# Patient Record
Sex: MALE | Race: WHITE | NOT HISPANIC OR LATINO | Employment: OTHER | URBAN - METROPOLITAN AREA
[De-identification: names, ages, dates, MRNs, and addresses within clinical notes are randomized per-mention and may not be internally consistent; named-entity substitution may affect disease eponyms.]

---

## 2019-01-19 ENCOUNTER — HOSPITAL ENCOUNTER (INPATIENT)
Facility: HOSPITAL | Age: 84
LOS: 2 days | Discharge: NURSING, REHAB OR LONG-TERM CARE | DRG: 057 | End: 2019-01-21
Attending: EMERGENCY MEDICINE | Admitting: INTERNAL MEDICINE
Payer: COMMERCIAL

## 2019-01-19 ENCOUNTER — APPOINTMENT (EMERGENCY)
Dept: CT IMAGING | Facility: HOSPITAL | Age: 84
DRG: 057 | End: 2019-01-19
Payer: COMMERCIAL

## 2019-01-19 DIAGNOSIS — G30.1 LATE ONSET ALZHEIMER'S DISEASE WITH BEHAVIORAL DISTURBANCE (HCC): Chronic | ICD-10-CM

## 2019-01-19 DIAGNOSIS — R46.89 AGGRESSIVE BEHAVIOR: Primary | ICD-10-CM

## 2019-01-19 DIAGNOSIS — F02.81 LATE ONSET ALZHEIMER'S DISEASE WITH BEHAVIORAL DISTURBANCE (HCC): Chronic | ICD-10-CM

## 2019-01-19 DIAGNOSIS — R41.82 ALTERED MENTAL STATUS: ICD-10-CM

## 2019-01-19 PROBLEM — I25.10 CORONARY ARTERY DISEASE INVOLVING NATIVE CORONARY ARTERY OF NATIVE HEART WITHOUT ANGINA PECTORIS: Chronic | Status: ACTIVE | Noted: 2019-01-19

## 2019-01-19 PROBLEM — F32.A ANXIETY AND DEPRESSION: Chronic | Status: ACTIVE | Noted: 2019-01-19

## 2019-01-19 PROBLEM — I10 ESSENTIAL HYPERTENSION: Chronic | Status: ACTIVE | Noted: 2019-01-19

## 2019-01-19 PROBLEM — F03.91 DEMENTIA WITH AGGRESSIVE BEHAVIOR (HCC): Status: ACTIVE | Noted: 2019-01-19

## 2019-01-19 PROBLEM — F41.9 ANXIETY AND DEPRESSION: Chronic | Status: ACTIVE | Noted: 2019-01-19

## 2019-01-19 PROBLEM — I48.0 PAROXYSMAL ATRIAL FIBRILLATION (HCC): Chronic | Status: ACTIVE | Noted: 2019-01-19

## 2019-01-19 LAB
ALBUMIN SERPL BCP-MCNC: 3.4 G/DL (ref 3.5–5)
ALP SERPL-CCNC: 83 U/L (ref 46–116)
ALT SERPL W P-5'-P-CCNC: 34 U/L (ref 12–78)
AMMONIA PLAS-SCNC: 15 UMOL/L (ref 11–35)
ANION GAP SERPL CALCULATED.3IONS-SCNC: 9 MMOL/L (ref 4–13)
AST SERPL W P-5'-P-CCNC: 41 U/L (ref 5–45)
BACTERIA UR QL AUTO: NORMAL /HPF
BASE EX.OXY STD BLDV CALC-SCNC: 85.2 % (ref 60–80)
BASE EXCESS BLDV CALC-SCNC: 2.2 MMOL/L
BASOPHILS # BLD AUTO: 0.04 THOUSANDS/ΜL (ref 0–0.1)
BASOPHILS NFR BLD AUTO: 1 % (ref 0–1)
BILIRUB SERPL-MCNC: 0.7 MG/DL (ref 0.2–1)
BILIRUB UR QL STRIP: NEGATIVE
BUN SERPL-MCNC: 18 MG/DL (ref 5–25)
CALCIUM SERPL-MCNC: 9 MG/DL (ref 8.3–10.1)
CHLORIDE SERPL-SCNC: 106 MMOL/L (ref 100–108)
CK MB SERPL-MCNC: 1.8 NG/ML (ref 0–5)
CK MB SERPL-MCNC: <1 % (ref 0–2.5)
CK SERPL-CCNC: 534 U/L (ref 39–308)
CLARITY UR: CLEAR
CO2 SERPL-SCNC: 28 MMOL/L (ref 21–32)
COLOR UR: YELLOW
CREAT SERPL-MCNC: 1.01 MG/DL (ref 0.6–1.3)
EOSINOPHIL # BLD AUTO: 0.11 THOUSAND/ΜL (ref 0–0.61)
EOSINOPHIL NFR BLD AUTO: 2 % (ref 0–6)
ERYTHROCYTE [DISTWIDTH] IN BLOOD BY AUTOMATED COUNT: 13.9 % (ref 11.6–15.1)
GFR SERPL CREATININE-BSD FRML MDRD: 68 ML/MIN/1.73SQ M
GLUCOSE SERPL-MCNC: 80 MG/DL (ref 65–140)
GLUCOSE UR STRIP-MCNC: NEGATIVE MG/DL
HCO3 BLDV-SCNC: 26.3 MMOL/L (ref 24–30)
HCT VFR BLD AUTO: 43.5 % (ref 36.5–49.3)
HGB BLD-MCNC: 14 G/DL (ref 12–17)
HGB UR QL STRIP.AUTO: NEGATIVE
IMM GRANULOCYTES # BLD AUTO: 0.03 THOUSAND/UL (ref 0–0.2)
IMM GRANULOCYTES NFR BLD AUTO: 1 % (ref 0–2)
INR PPP: 2.67 (ref 0.86–1.17)
KETONES UR STRIP-MCNC: NEGATIVE MG/DL
LACTATE SERPL-SCNC: 1.5 MMOL/L (ref 0.5–2)
LEUKOCYTE ESTERASE UR QL STRIP: NEGATIVE
LYMPHOCYTES # BLD AUTO: 1.2 THOUSANDS/ΜL (ref 0.6–4.47)
LYMPHOCYTES NFR BLD AUTO: 22 % (ref 14–44)
MCH RBC QN AUTO: 30.1 PG (ref 26.8–34.3)
MCHC RBC AUTO-ENTMCNC: 32.2 G/DL (ref 31.4–37.4)
MCV RBC AUTO: 94 FL (ref 82–98)
MONOCYTES # BLD AUTO: 0.57 THOUSAND/ΜL (ref 0.17–1.22)
MONOCYTES NFR BLD AUTO: 11 % (ref 4–12)
NEUTROPHILS # BLD AUTO: 3.49 THOUSANDS/ΜL (ref 1.85–7.62)
NEUTS SEG NFR BLD AUTO: 63 % (ref 43–75)
NITRITE UR QL STRIP: NEGATIVE
NON-SQ EPI CELLS URNS QL MICRO: NORMAL /HPF
NRBC BLD AUTO-RTO: 0 /100 WBCS
O2 CT BLDV-SCNC: 17.4 ML/DL
PCO2 BLDV: 39.2 MM HG (ref 42–50)
PH BLDV: 7.44 [PH] (ref 7.3–7.4)
PH UR STRIP.AUTO: 5.5 [PH] (ref 4.5–8)
PLATELET # BLD AUTO: 151 THOUSANDS/UL (ref 149–390)
PMV BLD AUTO: 11.2 FL (ref 8.9–12.7)
PO2 BLDV: 46.5 MM HG (ref 35–45)
POTASSIUM SERPL-SCNC: 3.9 MMOL/L (ref 3.5–5.3)
PROT SERPL-MCNC: 7.1 G/DL (ref 6.4–8.2)
PROT UR STRIP-MCNC: NEGATIVE MG/DL
PROTHROMBIN TIME: 27.6 SECONDS (ref 11.8–14.2)
RBC # BLD AUTO: 4.65 MILLION/UL (ref 3.88–5.62)
RBC #/AREA URNS AUTO: NORMAL /HPF
SODIUM SERPL-SCNC: 143 MMOL/L (ref 136–145)
SP GR UR STRIP.AUTO: 1.01 (ref 1–1.03)
TROPONIN I SERPL-MCNC: <0.02 NG/ML
TSH SERPL DL<=0.05 MIU/L-ACNC: 1.75 UIU/ML (ref 0.36–3.74)
UROBILINOGEN UR QL STRIP.AUTO: 0.2 E.U./DL
WBC # BLD AUTO: 5.44 THOUSAND/UL (ref 4.31–10.16)
WBC #/AREA URNS AUTO: NORMAL /HPF

## 2019-01-19 PROCEDURE — 82553 CREATINE MB FRACTION: CPT | Performed by: EMERGENCY MEDICINE

## 2019-01-19 PROCEDURE — 82805 BLOOD GASES W/O2 SATURATION: CPT | Performed by: EMERGENCY MEDICINE

## 2019-01-19 PROCEDURE — 99285 EMERGENCY DEPT VISIT HI MDM: CPT

## 2019-01-19 PROCEDURE — 80053 COMPREHEN METABOLIC PANEL: CPT | Performed by: EMERGENCY MEDICINE

## 2019-01-19 PROCEDURE — 83605 ASSAY OF LACTIC ACID: CPT | Performed by: EMERGENCY MEDICINE

## 2019-01-19 PROCEDURE — 85025 COMPLETE CBC W/AUTO DIFF WBC: CPT | Performed by: EMERGENCY MEDICINE

## 2019-01-19 PROCEDURE — 82140 ASSAY OF AMMONIA: CPT | Performed by: EMERGENCY MEDICINE

## 2019-01-19 PROCEDURE — 87086 URINE CULTURE/COLONY COUNT: CPT | Performed by: EMERGENCY MEDICINE

## 2019-01-19 PROCEDURE — 81001 URINALYSIS AUTO W/SCOPE: CPT | Performed by: EMERGENCY MEDICINE

## 2019-01-19 PROCEDURE — 96372 THER/PROPH/DIAG INJ SC/IM: CPT

## 2019-01-19 PROCEDURE — 85610 PROTHROMBIN TIME: CPT | Performed by: EMERGENCY MEDICINE

## 2019-01-19 PROCEDURE — 93005 ELECTROCARDIOGRAM TRACING: CPT

## 2019-01-19 PROCEDURE — 99223 1ST HOSP IP/OBS HIGH 75: CPT | Performed by: INTERNAL MEDICINE

## 2019-01-19 PROCEDURE — 82550 ASSAY OF CK (CPK): CPT | Performed by: EMERGENCY MEDICINE

## 2019-01-19 PROCEDURE — 84484 ASSAY OF TROPONIN QUANT: CPT | Performed by: EMERGENCY MEDICINE

## 2019-01-19 PROCEDURE — 36415 COLL VENOUS BLD VENIPUNCTURE: CPT | Performed by: EMERGENCY MEDICINE

## 2019-01-19 PROCEDURE — 84443 ASSAY THYROID STIM HORMONE: CPT | Performed by: EMERGENCY MEDICINE

## 2019-01-19 PROCEDURE — 70450 CT HEAD/BRAIN W/O DYE: CPT

## 2019-01-19 RX ORDER — HALOPERIDOL 5 MG/ML
5 INJECTION INTRAMUSCULAR EVERY 6 HOURS PRN
Status: DISCONTINUED | OUTPATIENT
Start: 2019-01-19 | End: 2019-01-21 | Stop reason: HOSPADM

## 2019-01-19 RX ORDER — ATENOLOL 25 MG/1
25 TABLET ORAL DAILY
COMMUNITY
Start: 2017-07-28

## 2019-01-19 RX ORDER — LORAZEPAM 2 MG/ML
1 INJECTION INTRAMUSCULAR ONCE
Status: DISCONTINUED | OUTPATIENT
Start: 2019-01-19 | End: 2019-01-19

## 2019-01-19 RX ORDER — WARFARIN SODIUM 5 MG/1
5 TABLET ORAL
Status: DISCONTINUED | OUTPATIENT
Start: 2019-01-19 | End: 2019-01-19

## 2019-01-19 RX ORDER — WARFARIN SODIUM 2.5 MG/1
5 TABLET ORAL
COMMUNITY
Start: 2017-10-06

## 2019-01-19 RX ORDER — PRAVASTATIN SODIUM 40 MG
40 TABLET ORAL DAILY
COMMUNITY
Start: 2017-03-30

## 2019-01-19 RX ORDER — OLANZAPINE 2.5 MG/1
2.5 TABLET ORAL 2 TIMES DAILY
Status: DISCONTINUED | OUTPATIENT
Start: 2019-01-19 | End: 2019-01-20

## 2019-01-19 RX ORDER — OLANZAPINE 10 MG/1
5 INJECTION, POWDER, LYOPHILIZED, FOR SOLUTION INTRAMUSCULAR ONCE
Status: COMPLETED | OUTPATIENT
Start: 2019-01-19 | End: 2019-01-19

## 2019-01-19 RX ORDER — ATENOLOL 25 MG/1
25 TABLET ORAL DAILY
Status: DISCONTINUED | OUTPATIENT
Start: 2019-01-20 | End: 2019-01-21 | Stop reason: HOSPADM

## 2019-01-19 RX ORDER — LORAZEPAM 1 MG/1
1 TABLET ORAL EVERY 8 HOURS PRN
Status: DISCONTINUED | OUTPATIENT
Start: 2019-01-19 | End: 2019-01-21 | Stop reason: HOSPADM

## 2019-01-19 RX ORDER — TAMSULOSIN HYDROCHLORIDE 0.4 MG/1
0.4 CAPSULE ORAL DAILY
COMMUNITY

## 2019-01-19 RX ORDER — TAMSULOSIN HYDROCHLORIDE 0.4 MG/1
0.4 CAPSULE ORAL
Status: DISCONTINUED | OUTPATIENT
Start: 2019-01-19 | End: 2019-01-21 | Stop reason: HOSPADM

## 2019-01-19 RX ORDER — WARFARIN SODIUM 7.5 MG/1
7.5 TABLET ORAL
Status: DISCONTINUED | OUTPATIENT
Start: 2019-01-19 | End: 2019-01-21 | Stop reason: HOSPADM

## 2019-01-19 RX ORDER — OXYBUTYNIN CHLORIDE 5 MG/1
10 TABLET, EXTENDED RELEASE ORAL DAILY
Status: DISCONTINUED | OUTPATIENT
Start: 2019-01-20 | End: 2019-01-21 | Stop reason: HOSPADM

## 2019-01-19 RX ORDER — WARFARIN SODIUM 5 MG/1
5 TABLET ORAL
Status: DISCONTINUED | OUTPATIENT
Start: 2019-01-20 | End: 2019-01-21 | Stop reason: HOSPADM

## 2019-01-19 RX ORDER — ACETAMINOPHEN 325 MG/1
650 TABLET ORAL EVERY 6 HOURS PRN
Status: DISCONTINUED | OUTPATIENT
Start: 2019-01-19 | End: 2019-01-21 | Stop reason: HOSPADM

## 2019-01-19 RX ORDER — LOSARTAN POTASSIUM 25 MG/1
25 TABLET ORAL DAILY
COMMUNITY
Start: 2017-08-17 | End: 2019-07-10

## 2019-01-19 RX ORDER — OLANZAPINE 2.5 MG/1
2.5 TABLET ORAL 2 TIMES DAILY
Status: ON HOLD | COMMUNITY
End: 2019-01-21

## 2019-01-19 RX ORDER — LOSARTAN POTASSIUM 25 MG/1
25 TABLET ORAL DAILY
Status: DISCONTINUED | OUTPATIENT
Start: 2019-01-20 | End: 2019-01-21 | Stop reason: HOSPADM

## 2019-01-19 RX ORDER — LEVOTHYROXINE SODIUM 0.03 MG/1
25 TABLET ORAL DAILY
COMMUNITY
Start: 2016-07-13

## 2019-01-19 RX ORDER — PRAVASTATIN SODIUM 40 MG
40 TABLET ORAL DAILY
Status: DISCONTINUED | OUTPATIENT
Start: 2019-01-20 | End: 2019-01-21 | Stop reason: HOSPADM

## 2019-01-19 RX ORDER — WARFARIN SODIUM 7.5 MG/1
7.5 TABLET ORAL
COMMUNITY
End: 2019-07-10

## 2019-01-19 RX ORDER — LEVOTHYROXINE SODIUM 0.03 MG/1
25 TABLET ORAL
Status: DISCONTINUED | OUTPATIENT
Start: 2019-01-20 | End: 2019-01-21 | Stop reason: HOSPADM

## 2019-01-19 RX ORDER — ONDANSETRON 2 MG/ML
4 INJECTION INTRAMUSCULAR; INTRAVENOUS EVERY 6 HOURS PRN
Status: DISCONTINUED | OUTPATIENT
Start: 2019-01-19 | End: 2019-01-21 | Stop reason: HOSPADM

## 2019-01-19 RX ORDER — LORAZEPAM 1 MG/1
1 TABLET ORAL EVERY 8 HOURS PRN
Status: ON HOLD | COMMUNITY
End: 2019-01-21

## 2019-01-19 RX ADMIN — OLANZAPINE 5 MG: 10 INJECTION, POWDER, FOR SOLUTION INTRAMUSCULAR at 12:31

## 2019-01-19 RX ADMIN — HALOPERIDOL LACTATE 5 MG: 5 INJECTION, SOLUTION INTRAMUSCULAR at 16:34

## 2019-01-19 RX ADMIN — TAMSULOSIN HYDROCHLORIDE 0.4 MG: 0.4 CAPSULE ORAL at 17:59

## 2019-01-19 RX ADMIN — WARFARIN SODIUM 7.5 MG: 7.5 TABLET ORAL at 17:58

## 2019-01-19 RX ADMIN — OLANZAPINE 2.5 MG: 2.5 TABLET, FILM COATED ORAL at 17:58

## 2019-01-19 NOTE — ED NOTES
Patient resting in bed, no problems  States when he has to pee            Bettina Tang  01/19/19 4729

## 2019-01-19 NOTE — ED PROVIDER NOTES
History  Chief Complaint   Patient presents with    Aggressive Behavior     pt comes from  due to "302" filled out by case management at facility  Per staff, pt has been getting increasingly aggitative, agressive, and homicidal  Pt has been "tested for UTI" per staff  On arrival pt is accompanied by EMS and Police in soft restraints  HPI     80-year-old male with history of anxiety, depression, paroxysmal atrial fibrillation on Coumadin, hypertension, hyperlipidemia, who presents from [facility] skilled nursing facility with aggression that has been getting progressively worse over the last 3 days  I spoke with Jani Gallagher, the care manager at the pt's SNF, who states the patient has been having homicidal ideation towards members of the staff there  The patient's wife also resides at the nursing facility after having a stroke  Jani Gallagher states that the patient goes upstairs to visit his wife, and is convinced that a female patient on his wife's floor is actually a man and has been coming on to his wife  He has tried to hurt that patient, and when attempts were made by staff to redirect him, he became violent, kicking and punching at staff  This has been increasingly escalating over the last 3 days  He has had some memory impairment prior to the last 3 days, but this has also been getting acutely worse over this period of time  She filed a 302 to have the patient brought to the emergency department  She states that the patient is not safe to return to their facility and they are concerned that he will end up harming himself, another resident there, or a staff member there  She denies any fevers or known recent illnesses for the patient  The patient has been refusing to take his medications for the last week  His medication list does include Zyprexa 2 5 mg 2 times a day, Ativan 1 mg as needed for aggression or anxiety, Coumadin, tamsulosin, losartan, levothyroxine      The pt denies any physical complaints  Prior to Admission Medications   Prescriptions Last Dose Informant Patient Reported? Taking? LORazepam (ATIVAN) 1 mg tablet   Yes Yes   Sig: Take 1 mg by mouth every 8 (eight) hours as needed for anxiety   Mirabegron ER (MYRBETRIQ) 50 MG TB24   Yes Yes   Sig: Take by mouth   OLANZapine (ZyPREXA) 2 5 mg tablet   Yes Yes   Sig: Take 2 5 mg by mouth 2 (two) times a day   TAMSULOSIN HCL PO   Yes Yes   Sig: Take by mouth   atenolol (TENORMIN) 25 mg tablet   Yes Yes   Sig: Take 25 mg by mouth daily   levothyroxine 25 mcg tablet   Yes Yes   Sig: Take 25 mcg by mouth daily   losartan (COZAAR) 25 mg tablet   Yes Yes   Sig: Take 25 mg by mouth daily   pravastatin (PRAVACHOL) 40 mg tablet   Yes Yes   Sig: Take 40 mg by mouth daily   warfarin (COUMADIN) 2 5 mg tablet   Yes Yes   Sig: Take 5 mg by mouth daily at bedtime     warfarin (COUMADIN) 7 5 mg tablet   Yes Yes   Sig: Take 7 5 mg by mouth daily      Facility-Administered Medications: None       Past Medical History:   Diagnosis Date    Cardiac disease     Hyperlipidemia     Hypertension        Past Surgical History:   Procedure Laterality Date    ANGIOPLASTY      THYROIDECTOMY         History reviewed  No pertinent family history  I have reviewed and agree with the history as documented  Social History   Substance Use Topics    Smoking status: Former Smoker    Smokeless tobacco: Not on file    Alcohol use No        Review of Systems   Constitutional: Negative for chills and fever  HENT: Negative for congestion  Eyes: Negative for visual disturbance  Respiratory: Negative for cough and shortness of breath  Cardiovascular: Negative for chest pain and leg swelling  Gastrointestinal: Negative for abdominal pain, diarrhea, nausea and vomiting  Genitourinary: Negative for dysuria and frequency  Musculoskeletal: Negative for arthralgias, back pain, neck pain and neck stiffness  Skin: Negative for rash     Neurological: Negative for weakness, numbness and headaches  Psychiatric/Behavioral: Positive for agitation, behavioral problems and confusion  Physical Exam  Physical Exam   Constitutional: He is oriented to person, place, and time  He appears well-developed and well-nourished  HENT:   Head: Normocephalic and atraumatic  Right Ear: External ear normal    Left Ear: External ear normal    Nose: Nose normal    Mouth/Throat: Oropharynx is clear and moist    Eyes: Conjunctivae are normal    Neck: Normal range of motion  Neck supple  Cardiovascular: Normal rate, regular rhythm and normal heart sounds  Exam reveals no gallop and no friction rub  No murmur heard  Pulmonary/Chest: Effort normal and breath sounds normal  No respiratory distress  He has no wheezes  He has no rales  Abdominal: Soft  Bowel sounds are normal  He exhibits no distension  There is no tenderness  There is no guarding  Musculoskeletal: Normal range of motion  He exhibits no edema or deformity  Neurological: He is alert and oriented to person, place, and time  He exhibits normal muscle tone  Skin: Skin is warm and dry  He is not diaphoretic  Psychiatric:   Intermittently agitated, shouting that he's angry and wants to go home, amenable to verbal de-escalation         Vital Signs  ED Triage Vitals   Temperature Pulse Respirations Blood Pressure SpO2   01/19/19 1245 01/19/19 1219 01/19/19 1219 01/19/19 1219 01/19/19 1219   98 1 °F (36 7 °C) 89 18 129/87 99 %      Temp Source Heart Rate Source Patient Position - Orthostatic VS BP Location FiO2 (%)   01/19/19 1245 01/19/19 1219 01/19/19 1219 01/19/19 1219 --   Oral Monitor Lying Right arm       Pain Score       01/19/19 1219       No Pain           Vitals:    01/19/19 1330 01/19/19 1427 01/19/19 1527 01/19/19 1557   BP: 115/94 136/82 128/83 133/98   Pulse: 79 74 85 87   Patient Position - Orthostatic VS: Lying Lying Sitting Sitting       Visual Acuity      ED Medications  Medications   atenolol (TENORMIN) tablet 25 mg (not administered)   levothyroxine tablet 25 mcg (not administered)   LORazepam (ATIVAN) tablet 1 mg (not administered)   losartan (COZAAR) tablet 25 mg (not administered)   oxybutynin (DITROPAN-XL) 24 hr tablet 10 mg (not administered)   OLANZapine (ZyPREXA) tablet 2 5 mg (2 5 mg Oral Given 1/19/19 1758)   pravastatin (PRAVACHOL) tablet 40 mg (not administered)   tamsulosin (FLOMAX) capsule 0 4 mg (0 4 mg Oral Given 1/19/19 1759)   warfarin (COUMADIN) tablet 7 5 mg (7 5 mg Oral Given 1/19/19 1758)   ondansetron (ZOFRAN) injection 4 mg (not administered)   acetaminophen (TYLENOL) tablet 650 mg (not administered)   haloperidol lactate (HALDOL) injection 5 mg (5 mg Intramuscular Given 1/19/19 1634)   warfarin (COUMADIN) tablet 5 mg (not administered)   OLANZapine (ZyPREXA) IM injection 5 mg (5 mg Intramuscular Given 1/19/19 1231)       Diagnostic Studies  Results Reviewed     Procedure Component Value Units Date/Time    TSH [[id number]]  (Normal) Collected:  01/19/19 1256    Lab Status:  Final result Specimen:  Blood from Arm, Right Updated:  01/19/19 1449     TSH 3RD GENERATON 1 747 uIU/mL     Narrative:         Patients undergoing fluorescein dye angiography may retain small amounts of fluorescein in the body for 48-72 hours post procedure  Samples containing fluorescein can produce falsely depressed TSH values  If the patient had this procedure,a specimen should be resubmitted post fluorescein clearance      Protime-INR [[id number]]  (Abnormal) Collected:  01/19/19 1405    Lab Status:  Final result Specimen:  Blood from Arm, Left Updated:  01/19/19 1429     Protime 27 6 (H) seconds      INR 2 67 (H)    CKMB [[id number]]  (Normal) Collected:  01/19/19 1256    Lab Status:  Final result Specimen:  Blood from Arm, Right Updated:  01/19/19 1343     CK-MB Index <1 0 %      CK-MB 1 8 ng/mL     CK (with reflex to MB) [[id number]]  (Abnormal) Collected:  01/19/19 1256    Lab Status:  Final result Specimen: Blood from Arm, Right Updated:  01/19/19 1342     Total  (H) U/L     Urinalysis with microscopic [[id number]] Collected:  01/19/19 1319    Lab Status:  Final result Specimen:  Urine from Urine, Straight Cath Updated:  01/19/19 1335     Clarity, UA Clear     Color, UA Yellow     Specific Gravity, UA 1 010     pH, UA 5 5     Glucose, UA Negative mg/dl      Ketones, UA Negative mg/dl      Blood, UA Negative     Protein, UA Negative mg/dl      Nitrite, UA Negative     Bilirubin, UA Negative     Urobilinogen, UA 0 2 E U /dl      Leukocytes, UA Negative     WBC, UA None Seen /hpf      RBC, UA None Seen /hpf      Bacteria, UA None Seen /hpf      Epithelial Cells Occasional /hpf     Lactic acid x2 Q2H [[id number]]  (Normal) Collected:  01/19/19 1256    Lab Status:  Final result Specimen:  Blood from Arm, Right Updated:  01/19/19 1326     LACTIC ACID 1 5 mmol/L     Narrative:         Result may be elevated if tourniquet was used during collection  Troponin I [[id number]]  (Normal) Collected:  01/19/19 1256    Lab Status:  Final result Specimen:  Blood from Arm, Right Updated:  01/19/19 1325     Troponin I <0 02 ng/mL     Urine culture [[id number]] Collected:  01/19/19 1319    Lab Status:   In process Specimen:  Urine from Urine, Other Updated:  01/19/19 1323    Comprehensive metabolic panel [[id number]]  (Abnormal) Collected:  01/19/19 1256    Lab Status:  Final result Specimen:  Blood from Arm, Right Updated:  01/19/19 1323     Sodium 143 mmol/L      Potassium 3 9 mmol/L      Chloride 106 mmol/L      CO2 28 mmol/L      ANION GAP 9 mmol/L      BUN 18 mg/dL      Creatinine 1 01 mg/dL      Glucose 80 mg/dL      Calcium 9 0 mg/dL      AST 41 U/L      ALT 34 U/L      Alkaline Phosphatase 83 U/L      Total Protein 7 1 g/dL      Albumin 3 4 (L) g/dL      Total Bilirubin 0 70 mg/dL      eGFR 68 ml/min/1 73sq m     Narrative:         National Kidney Disease Education Program recommendations are as follows:  GFR calculation is accurate only with a steady state creatinine  Chronic Kidney disease less than 60 ml/min/1 73 sq  meters  Kidney failure less than 15 ml/min/1 73 sq  meters  Ammonia [[id number]]  (Normal) Collected:  01/19/19 1256    Lab Status:  Final result Specimen:  Blood from Arm, Right Updated:  01/19/19 1318     Ammonia 15 umol/L     Blood gas, venous [[id number]]  (Abnormal) Collected:  01/19/19 1256    Lab Status:  Final result Specimen:  Blood from Arm, Right Updated:  01/19/19 1305     pH, Jarett 7 444 (H)     pCO2, Jarett 39 2 (L) mm Hg      pO2, Jarett 46 5 (H) mm Hg      HCO3, Jarett 26 3 mmol/L      Base Excess, Jarett 2 2 mmol/L      O2 Content, Jarett 17 4 ml/dL      O2 HGB, VENOUS 85 2 (H) %     CBC and differential [[id number]] Collected:  01/19/19 1256    Lab Status:  Final result Specimen:  Blood from Arm, Right Updated:  01/19/19 1302     WBC 5 44 Thousand/uL      RBC 4 65 Million/uL      Hemoglobin 14 0 g/dL      Hematocrit 43 5 %      MCV 94 fL      MCH 30 1 pg      MCHC 32 2 g/dL      RDW 13 9 %      MPV 11 2 fL      Platelets 835 Thousands/uL      nRBC 0 /100 WBCs      Neutrophils Relative 63 %      Immat GRANS % 1 %      Lymphocytes Relative 22 %      Monocytes Relative 11 %      Eosinophils Relative 2 %      Basophils Relative 1 %      Neutrophils Absolute 3 49 Thousands/µL      Immature Grans Absolute 0 03 Thousand/uL      Lymphocytes Absolute 1 20 Thousands/µL      Monocytes Absolute 0 57 Thousand/µL      Eosinophils Absolute 0 11 Thousand/µL      Basophils Absolute 0 04 Thousands/µL                  CT head without contrast   Final Result by Leilani Quinones MD (01/19 1437)      No acute intracranial abnormality  Microangiopathic changes                    Workstation performed: [id number]                    Procedures  Procedures       Phone Contacts  ED Phone Contact    ED Course                               MDM  Number of Diagnoses or Management Options  Aggressive behavior:   Altered mental status:   Diagnosis management comments: Patient arrives in 4 point restraints placed by EMS for agitation  He is yelling, kicking, and straining against the restraints  He keeps repetitively screaming about someone at the place where he lives "messing with my wife "  He was able to be verbally de-escalated by staff  He denies any physical complaints  He is oriented to person and place, thinks the year is 2018, knows the month  He is unable to give me any information about his medical history  States that "I'm going to get in my car and go after those people that are hurting my wife "  He is unable to tell me exactly what's going on with his wife that he is concerned about  Afebrile and hemodynamically stable  Nontoxic appearing  CBC with no leukocytosis and normal hemoglobin  CMP unremarkable with normal renal function  CK obtained as the patient has been straining against his restraints, elevated to 534  She had restraints removed shortly after patient arrived after de-escalation  No lactic acidosis  Troponin is not elevated  I personally interpreted the patient's EKG, which reveals AFib with left axis deviation, normal intervals, Q-wave in leads V2 and V3, no prior available for comparison  No ST segment deviation or T-wave inversions  Venous blood gas with mild respiratory alkalosis, no hypercarbia  Urinalysis without evidence of UTI  INR is therapeutic at 2 67  CT head with no acute intracranial abnormalities  Patient given 5 milligrams of IM Zyprexa with resolution of his agitation  He does have Zyprexa listed on his home medication list, but has reportedly been refusing to take this at the nursing facility  Given his negative workup and benign physical exam, I have a low suspicion for underlying organic etiology of his symptoms, and I am suspicious that he is developing dementia  He would benefit from neuro cognitive evaluation    He is likely not safe to return to his skilled nursing facility given their concern for his ability to harm himself or other people there  May benefit from placement at a memory care unit  Will admit to Medicine for further workup  Amount and/or Complexity of Data Reviewed  Clinical lab tests: ordered and reviewed  Tests in the radiology section of CPT®: ordered and reviewed  Obtain history from someone other than the patient: yes  Independent visualization of images, tracings, or specimens: yes    Patient Progress  Patient progress: stable    CritCare Time         Disposition  Final diagnoses:   Aggressive behavior   Altered mental status     Time reflects when diagnosis was documented in both MDM as applicable and the Disposition within this note     Time User Action Codes Description Comment    1/19/2019  3:34 PM Ronna Jakes Add [R46 89] Aggressive behavior     1/19/2019  3:34 PM Ronna Jakes Add [R41 82] Altered mental status     1/19/2019  4:12 PM Gibsland Kierra Add [G30 1,  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     1/19/2019  4:12 PM [place] Kierra Modify [G30 1,  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     1/19/2019  4:12 PM [place] Kierra Modify [G30 1,  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       ED Disposition     ED Disposition Condition Comment    Admit  Case was discussed with SHEEBA and the patient's admission status was agreed to be Admission Status: inpatient status to the service of Dr Lynn Martinez           Follow-up Information    None         Current Discharge Medication List      CONTINUE these medications which have NOT CHANGED    Details   atenolol (TENORMIN) 25 mg tablet Take 25 mg by mouth daily      levothyroxine 25 mcg tablet Take 25 mcg by mouth daily      LORazepam (ATIVAN) 1 mg tablet Take 1 mg by mouth every 8 (eight) hours as needed for anxiety      losartan (COZAAR) 25 mg tablet Take 25 mg by mouth daily      Mirabegron ER (MYRBETRIQ) 50 MG TB24 Take by mouth      OLANZapine (ZyPREXA) 2 5 mg tablet Take 2 5 mg by mouth 2 (two) times a day      pravastatin (PRAVACHOL) 40 mg tablet Take 40 mg by mouth daily      TAMSULOSIN HCL PO Take by mouth      !! warfarin (COUMADIN) 2 5 mg tablet Take 5 mg by mouth daily at bedtime        !! warfarin (COUMADIN) 7 5 mg tablet Take 7 5 mg by mouth daily       ! ! - Potential duplicate medications found  Please discuss with provider  No discharge procedures on file      ED Provider  Electronically Signed by           Jesus Hirsch MD  01/19/19 3309

## 2019-01-19 NOTE — ED NOTES
Additional staff called to bedside to administer IM medications       Kyle Bianchi RN  01/19/19 3762

## 2019-01-19 NOTE — ASSESSMENT & PLAN NOTE
· Patient with Anxiety and Depression listed on medical history of SNF  · Continue Ativan 1 mg PO Q8 PRN  · Zyprexa 2 5 mg BID   · Psych consult as per primary

## 2019-01-19 NOTE — ASSESSMENT & PLAN NOTE
· POA, patient with worsening few days of aggressive behavior, agitation, and paranoia  He believes that there is a female resident (who he believes is a male) is coming onto his wife  There may have been threats of violence with a firearm made and the facility petitioned for a 302  There was documentation from the ER provider who spoke with a nursing home employee that he was violent toward staff   Son described the patient as "ornery"  · Admit patient to med/surg under inpatient status   · Consult psychiatry   · Continue Zyprexa BID and Ativan Q8 PRN  · Haldol 5 mg IM Q6 PRN agitation
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
## 2019-01-19 NOTE — ED NOTES
Patient laying down, resraints are off at the moment   No complaints or distress     Bettina Ladd  01/19/19 8890

## 2019-01-20 LAB — BACTERIA UR CULT: NORMAL

## 2019-01-20 PROCEDURE — 99232 SBSQ HOSP IP/OBS MODERATE 35: CPT | Performed by: INTERNAL MEDICINE

## 2019-01-20 RX ORDER — DIVALPROEX SODIUM 125 MG/1
125 TABLET, DELAYED RELEASE ORAL EVERY 12 HOURS SCHEDULED
Status: DISCONTINUED | OUTPATIENT
Start: 2019-01-20 | End: 2019-01-21 | Stop reason: HOSPADM

## 2019-01-20 RX ORDER — OLANZAPINE 2.5 MG/1
5 TABLET ORAL 2 TIMES DAILY
Status: DISCONTINUED | OUTPATIENT
Start: 2019-01-20 | End: 2019-01-21 | Stop reason: HOSPADM

## 2019-01-20 RX ADMIN — LOSARTAN POTASSIUM 25 MG: 25 TABLET, FILM COATED ORAL at 09:18

## 2019-01-20 RX ADMIN — LEVOTHYROXINE SODIUM 25 MCG: 25 TABLET ORAL at 07:18

## 2019-01-20 RX ADMIN — PRAVASTATIN SODIUM 40 MG: 40 TABLET ORAL at 09:18

## 2019-01-20 RX ADMIN — ATENOLOL 25 MG: 25 TABLET ORAL at 09:18

## 2019-01-20 RX ADMIN — OLANZAPINE 5 MG: 2.5 TABLET, FILM COATED ORAL at 18:06

## 2019-01-20 RX ADMIN — WARFARIN SODIUM 5 MG: 5 TABLET ORAL at 18:06

## 2019-01-20 RX ADMIN — DIVALPROEX SODIUM 125 MG: 125 TABLET, DELAYED RELEASE ORAL at 21:46

## 2019-01-20 RX ADMIN — OLANZAPINE 2.5 MG: 2.5 TABLET, FILM COATED ORAL at 09:18

## 2019-01-20 RX ADMIN — OXYBUTYNIN CHLORIDE 10 MG: 5 TABLET, EXTENDED RELEASE ORAL at 09:18

## 2019-01-20 RX ADMIN — TAMSULOSIN HYDROCHLORIDE 0.4 MG: 0.4 CAPSULE ORAL at 18:05

## 2019-01-20 RX ADMIN — DIVALPROEX SODIUM 125 MG: 125 TABLET, DELAYED RELEASE ORAL at 14:16
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
## 2019-01-20 NOTE — PROGRESS NOTES
Pt admitted to floor around 1600  Patient disoriented and unable to complete Navigator  Bilateral wrist restraints ordered and applied  Patient also on continuous observation with a PCA (Heather) sitting in room
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
## 2019-01-20 NOTE — ASSESSMENT & PLAN NOTE
· Patient with Anxiety and Depression listed on medical history of SNF  · Continue Ativan 1 mg PO Q8 PRN  · Zyprexa 2 5 mg BID   · Started Depakote 125 mg PO Q12   · Discussed with attending   · Psych consult as per primary

## 2019-01-20 NOTE — PLAN OF CARE
Problem: Potential for Falls  Goal: Patient will remain free of falls  INTERVENTIONS:  - Assess patient frequently for physical needs  -  Identify cognitive and physical deficits and behaviors that affect risk of falls  -  [place] fall precautions as indicated by assessment   - Educate patient/family on patient safety including physical limitations  - Instruct patient to call for assistance with activity based on assessment  - Modify environment to reduce risk of injury  - Consider OT/PT consult to assist with strengthening/mobility   Outcome: Progressing      Problem: DISCHARGE PLANNING  Goal: Discharge to home or other facility with appropriate resources  INTERVENTIONS:  - Identify barriers to discharge w/patient and caregiver  - Arrange for needed discharge resources and transportation as appropriate  - Identify discharge learning needs (meds, wound care, etc )  - Arrange for interpretive services to assist at discharge as needed  - Refer to Case Management Department for coordinating discharge planning if the patient needs post-hospital services based on physician/advanced practitioner order or complex needs related to functional status, cognitive ability, or social support system  Outcome: Progressing      Problem: Knowledge Deficit  Goal: Patient/family/caregiver demonstrates understanding of disease process, treatment plan, medications, and discharge instructions  Complete learning assessment and assess knowledge base    Interventions:  - Provide teaching at level of understanding  - Provide teaching via preferred learning methods  Outcome: Progressing

## 2019-01-20 NOTE — PROGRESS NOTES
ER charge RN Avelina Burks delivered wallet to room brought in by EMS  $11 in wallet counted in front of him

## 2019-01-20 NOTE — PROGRESS NOTES
[facility]  Progress Note - Quincy Coad 1933, 80 y o  male MRN: [id number]    Unit/Bed#: -01 Encounter: [id number]    Primary Care Provider: Sheree Urena MD   Date and time admitted to hospital: 1/19/2019 12:10 PM        * Late onset Alzheimer's disease with behavioral disturbance   Assessment & Plan    · POA, patient with worsening few days of aggressive behavior, agitation, and paranoia  He believes that there is a female resident (who he believes is a male) is coming onto his wife  There may have been threats of violence with a firearm made and the facility petitioned for a 302 (however the legality of this is questionable given that it was NOT an MD/DO who filled out the 302)  There was documentation from the ER provider who spoke with a nursing home employee that he was violent toward staff  Son described the patient as "ornery"  He continues to perseverate over this person talking to his wife as well as the nursing home possibly stealing his money  He has only required one dose of Haldol yesterday and is now off restraints       · Consult psychiatry - recommendations pending   · Start Depakote 125 mg PO BID   · Continue Zyprexa BID and Ativan Q8 PRN  · Haldol 5 mg IM Q6 PRN agitation   · Discontinue restraints  · Continue 1:1     Paroxysmal atrial fibrillation (HCC)   Assessment & Plan    · Appears NSR at this time   · Continue current home management      Coronary artery disease involving native coronary artery of native heart without angina pectoris   Assessment & Plan    · Patient with listed history of angioplasty   · Continue Atenolol, Pravastatin     Anxiety and depression   Assessment & Plan    · Patient with Anxiety and Depression listed on medical history of SNF  · Continue Ativan 1 mg PO Q8 PRN  · Zyprexa 2 5 mg BID   · Started Depakote 125 mg PO Q12   · Discussed with attending   · Psych consult as per primary      Essential hypertension   Assessment & Plan · BP acceptable on admission   · Continue Atenolol, Losartan       VTE Pharmacologic Prophylaxis:   Pharmacologic: Warfarin (Coumadin)  Mechanical VTE Prophylaxis in Place: No    Patient Centered Rounds: I have performed bedside rounds with nursing staff today  Discussions with Specialists or Other Care Team Provider: Discussed with RN, LEANDRA    Education and Discussions with Family / Patient: Called patient's son     Time Spent for Care: 30 minutes  More than 50% of total time spent on counseling and coordination of care as described above  Current Length of Stay: 1 day(s)    Current Patient Status: Inpatient   Certification Statement: The patient will continue to require additional inpatient hospital stay due to pending psych recommendations and further safe discharge plans     Discharge Plan: Pending at this time    Code Status: Level 3 - DNAR and DNI      Subjective:   Patient continues to perseverate over the person that he believes is coming onto his wife  Continues to want to go home to see her and to stop the person he believes is pestering her from pestering her  No specific threats made, but he states that "he takes care of himself" and gestures with fists  1:1 stats that he has been doing okay off restraints  Will talk calmly, then talk about "that son of a bitch" and clench his fists, but is redirectable  Objective:     Vitals:   Temp (24hrs), Av °F (36 7 °C), Min:97 7 °F (36 5 °C), Max:98 1 °F (36 7 °C)    Temp:  [97 7 °F (36 5 °C)-98 1 °F (36 7 °C)] 97 7 °F (36 5 °C)  HR:  [61-89] 61  Resp:  [16-18] 18  BP: (100-136)/(52-98) 129/66  SpO2:  [94 %-100 %] 95 %  There is no height or weight on file to calculate BMI  Input and Output Summary (last 24 hours):        Intake/Output Summary (Last 24 hours) at 19 1130  Last data filed at 19 0804   Gross per 24 hour   Intake              300 ml   Output              350 ml   Net              -50 ml       Physical Exam:     Physical Exam Constitutional: Vital signs are normal  He appears well-developed and well-nourished  Non-toxic appearance  No distress  HENT:   Head: Normocephalic and atraumatic  Eyes: Pupils are equal, round, and reactive to light  Conjunctivae and EOM are normal    Neck: Neck supple  Cardiovascular: Normal rate, regular rhythm, S1 normal, S2 normal, normal heart sounds and intact distal pulses  Exam reveals no S3 and no S4  No murmur heard  Pulmonary/Chest: Effort normal and breath sounds normal  No accessory muscle usage  No respiratory distress  He has no decreased breath sounds  He has no wheezes  He has no rhonchi  He has no rales  He exhibits no tenderness  Abdominal: Soft  Bowel sounds are normal  He exhibits no distension and no mass  There is no tenderness  There is no rigidity, no rebound and no guarding  Neurological: He is alert  Skin: Skin is warm and dry  Psychiatric: His speech is normal  His affect is angry  He is agitated  He is not combative  Thought content is paranoid  He expresses no homicidal and no suicidal ideation  He expresses no suicidal plans and no homicidal plans  Additional Data:     Labs:      Results from last 7 days  Lab Units 01/19/19  1256   WBC Thousand/uL 5 44   HEMOGLOBIN g/dL 14 0   HEMATOCRIT % 43 5   PLATELETS Thousands/uL 151   NEUTROS PCT % 63   LYMPHS PCT % 22   MONOS PCT % 11   EOS PCT % 2       Results from last 7 days  Lab Units 01/19/19  1256   POTASSIUM mmol/L 3 9   CHLORIDE mmol/L 106   CO2 mmol/L 28   BUN mg/dL 18   CREATININE mg/dL 1 01   CALCIUM mg/dL 9 0   ALK PHOS U/L 83   ALT U/L 34   AST U/L 41       Results from last 7 days  Lab Units 01/19/19  1405   INR  2 67*       * I Have Reviewed All Lab Data Listed Above  * Additional Pertinent Lab Tests Reviewed:  All Labs For Current Hospital Admission Reviewed    Imaging:    Imaging Reports Reviewed Today Include: None  Imaging Personally Reviewed by Myself Includes:  None    Recent Cultures (last 7 days): Last 24 Hours Medication List:     Current Facility-Administered Medications:  acetaminophen 650 mg Oral Q6H PRN Chet Ashley PA-C   atenolol 25 mg Oral Daily Chet Ashley, ALETHEA   divalproex sodium 125 mg Oral Q12H [facility] Chet Ashley, ALETHEA   haloperidol lactate 5 mg Intramuscular Q6H PRN Laureano Kern PA-C   levothyroxine 25 mcg Oral Early Morning Chet Ashley, ALETHEA   LORazepam 1 mg Oral Q8H PRN Chet Calderon Killings, ALETHEA   losartan 25 mg Oral Daily Chet Ashley, ALETHEA   OLANZapine 2 5 mg Oral BID Chet Ashley, ALETHEA   ondansetron 4 mg Intravenous Q6H PRN Chet Edra Killings, ALETHEA   oxybutynin 10 mg Oral Daily Chet Edra Killings, ALETHEA   pravastatin 40 mg Oral Daily Chetjuancho Mendezs, ALETHEA   tamsulosin 0 4 mg Oral Daily With Dinner Chet Ashley PA-C   warfarin 5 mg Oral Once per day on Sun Mon Wed Fri Beba Ray MD   warfarin 7 5 mg Oral Once per day on Tue Thu Sat Laureano Kern PA-C        Today, Patient Was Seen By: Laureano Kern PA-C    ** Please Note: Dictation voice to text software may have been used in the creation of this document   **

## 2019-01-21 VITALS
OXYGEN SATURATION: 99 % | RESPIRATION RATE: 18 BRPM | TEMPERATURE: 98.1 F | WEIGHT: 214.95 LBS | DIASTOLIC BLOOD PRESSURE: 81 MMHG | HEART RATE: 60 BPM | SYSTOLIC BLOOD PRESSURE: 172 MMHG

## 2019-01-21 PROCEDURE — 99239 HOSP IP/OBS DSCHRG MGMT >30: CPT | Performed by: INTERNAL MEDICINE

## 2019-01-21 RX ORDER — OLANZAPINE 2.5 MG/1
5 TABLET ORAL 2 TIMES DAILY
Qty: 120 TABLET | Refills: 0 | Status: SHIPPED | OUTPATIENT
Start: 2019-01-21

## 2019-01-21 RX ORDER — LORAZEPAM 1 MG/1
1 TABLET ORAL EVERY 8 HOURS PRN
Qty: 60 TABLET | Refills: 0 | Status: SHIPPED | OUTPATIENT
Start: 2019-01-21 | End: 2019-01-31

## 2019-01-21 RX ORDER — DIVALPROEX SODIUM 125 MG/1
125 TABLET, DELAYED RELEASE ORAL EVERY 12 HOURS SCHEDULED
Qty: 60 TABLET | Refills: 0 | Status: SHIPPED | OUTPATIENT
Start: 2019-01-21

## 2019-01-21 RX ADMIN — ATENOLOL 25 MG: 25 TABLET ORAL at 08:29

## 2019-01-21 RX ADMIN — LEVOTHYROXINE SODIUM 25 MCG: 25 TABLET ORAL at 05:28

## 2019-01-21 RX ADMIN — OLANZAPINE 5 MG: 2.5 TABLET, FILM COATED ORAL at 08:29

## 2019-01-21 RX ADMIN — OXYBUTYNIN CHLORIDE 10 MG: 5 TABLET, EXTENDED RELEASE ORAL at 08:29

## 2019-01-21 RX ADMIN — DIVALPROEX SODIUM 125 MG: 125 TABLET, DELAYED RELEASE ORAL at 08:29

## 2019-01-21 RX ADMIN — LOSARTAN POTASSIUM 25 MG: 25 TABLET, FILM COATED ORAL at 08:29

## 2019-01-21 RX ADMIN — HALOPERIDOL LACTATE 5 MG: 5 INJECTION, SOLUTION INTRAMUSCULAR at 02:32

## 2019-01-21 RX ADMIN — PRAVASTATIN SODIUM 40 MG: 40 TABLET ORAL at 08:29

## 2019-01-21 NOTE — ASSESSMENT & PLAN NOTE
· POA, patient with worsening few days of aggressive behavior, agitation, and paranoia  He believes that there is a female resident (who he believes is a male) is coming onto his wife  There may have been threats of violence with a firearm made and the facility petitioned for a 302 (however the legality of this is questionable given that it was NOT an MD/DO who filled out the 302)  There was documentation from the ER provider who spoke with a nursing home employee that he was violent toward staff  Son described the patient as "ornery"  He continues to perseverate over this person talking to his wife as well as the nursing home possibly stealing his money  · Mood appears improved  Psych consult noted  Currently on Depakote 125 mg BID and Zyprexa 5 mg BID with PRN ativan available  · UA negative, Ucx negative   Afebrile, no leukocytosis   · Await CM input to see if we can get patient back to facility

## 2019-01-21 NOTE — ASSESSMENT & PLAN NOTE
· POA, patient with worsening few days of aggressive behavior, agitation, and paranoia  He believes that there is a female resident (who he believes is a male) is coming onto his wife  There may have been threats of violence with a firearm made and the facility petitioned for a 302 (however the legality of this is questionable given that it was NOT an MD/DO who filled out the 302)  There was documentation from the ER provider who spoke with a nursing home employee that he was violent toward staff  Son described the patient as "ornery"  · Mood appears improved  Psych consult noted  · Currently on Depakote 125 mg BID and Zyprexa 5 mg BID with PRN ativan available  · UA negative, Ucx negative   Afebrile, no leukocytosis, no identifiable infectious source   · Pt will return to [facility]

## 2019-01-21 NOTE — PROGRESS NOTES
Progress Note - Amanda Code 1933, 80 y o  male MRN: [id number]    Unit/Bed#: -01 Encounter: [id number]    Primary Care Provider: Tommie Holstein, MD   Date and time admitted to hospital: 1/19/2019 12:10 PM        * Late onset Alzheimer's disease with behavioral disturbance   Assessment & Plan    · POA, patient with worsening few days of aggressive behavior, agitation, and paranoia  He believes that there is a female resident (who he believes is a male) is coming onto his wife  There may have been threats of violence with a firearm made and the facility petitioned for a 302 (however the legality of this is questionable given that it was NOT an MD/DO who filled out the 302)  There was documentation from the ER provider who spoke with a nursing home employee that he was violent toward staff  Son described the patient as "ornery"  He continues to perseverate over this person talking to his wife as well as the nursing home possibly stealing his money  · Mood appears improved  Psych consult noted  Currently on Depakote 125 mg BID and Zyprexa 5 mg BID with PRN ativan available  · UA negative, Ucx negative   Afebrile, no leukocytosis   · Await CM input to see if we can get patient back to facility      Paroxysmal atrial fibrillation (HCC)   Assessment & Plan    · Appears NSR at this time   · Continue current home management      Anxiety and depression   Assessment & Plan    · Patient with Anxiety and Depression listed on medical history of SNF  · Continue current meds  · Appreciate psych input      Essential hypertension   Assessment & Plan    · BP acceptable on admission   · Continue Atenolol, Losartan     Coronary artery disease involving native coronary artery of native heart without angina pectoris   Assessment & Plan    · Patient with listed history of angioplasty   · Continue Atenolol, Pravastatin       VTE Pharmacologic Prophylaxis:   Pharmacologic: Warfarin (Coumadin)  Mechanical VTE Prophylaxis in Place: Yes    Patient Centered Rounds: I have performed bedside rounds with nursing staff today  Discussions with Specialists or Other Care Team Provider: case management  Education and Discussions with Family / Patient: patient  Left voicemail for son Demetrio Roby  Time Spent for Care: 30 minutes  More than 50% of total time spent on counseling and coordination of care as described above  Current Length of Stay: 2 day(s)    Current Patient Status: Inpatient   Certification Statement: The patient will continue to require additional inpatient hospital stay due to as above    Discharge Plan: medically stable  Mood improved  Awaiting discharge back to rehab if okay with CM    Code Status: Level 3 - DNAR and DNI      Subjective:   Patient reports doing well  He states that today is his bday and wants to go back to see his wife  Objective:     Vitals:   Temp (24hrs), Av °F (36 7 °C), Min:97 8 °F (36 6 °C), Max:98 1 °F (36 7 °C)    Temp:  [97 8 °F (36 6 °C)-98 1 °F (36 7 °C)] 98 1 °F (36 7 °C)  HR:  [60-66] 60  Resp:  [18] 18  BP: (132-172)/(74-81) 172/81  SpO2:  [95 %-99 %] 99 %  There is no height or weight on file to calculate BMI  Input and Output Summary (last 24 hours): Intake/Output Summary (Last 24 hours) at 19 1048  Last data filed at 19 1940   Gross per 24 hour   Intake              240 ml   Output              350 ml   Net             -110 ml       Physical Exam:     Physical Exam   Constitutional: No distress  Cardiovascular: Normal rate and regular rhythm  Pulmonary/Chest: Effort normal and breath sounds normal  No respiratory distress  Abdominal: Soft  Bowel sounds are normal  He exhibits no distension  There is no tenderness  Musculoskeletal: He exhibits no edema  Neurological:   Alert and oriented x 3 today  Skin: He is not diaphoretic  Nursing note and vitals reviewed        Additional Data:     Labs:      Results from last 7 days  Lab Units 19  1256 WBC Thousand/uL 5 44   HEMOGLOBIN g/dL 14 0   HEMATOCRIT % 43 5   PLATELETS Thousands/uL 151   NEUTROS PCT % 63   LYMPHS PCT % 22   MONOS PCT % 11   EOS PCT % 2       Results from last 7 days  Lab Units 01/19/19  1256   POTASSIUM mmol/L 3 9   CHLORIDE mmol/L 106   CO2 mmol/L 28   BUN mg/dL 18   CREATININE mg/dL 1 01   CALCIUM mg/dL 9 0   ALK PHOS U/L 83   ALT U/L 34   AST U/L 41       Results from last 7 days  Lab Units 01/19/19  1405   INR  2 67*       * I Have Reviewed All Lab Data Listed Above  * Additional Pertinent Lab Tests Reviewed: All Labs Within Last 24 Hours Reviewed    Imaging:    Imaging Reports Reviewed Today Include: all  Imaging Personally Reviewed by Myself Includes:  none    Recent Cultures (last 7 days):       Results from last 7 days  Lab Units 01/19/19  1319   URINE CULTURE  No Growth <1000 cfu/mL       Last 24 Hours Medication List:     Current Facility-Administered Medications:  acetaminophen 650 mg Oral Q6H PRN Chet Ashley PA-C   atenolol 25 mg Oral Daily RISHI Chaudhry-MARU   divalproex sodium 125 mg Oral Q12H [address] Chet JUANITO Ashley PA-C   haloperidol lactate 5 mg Intramuscular Q6H PRN Chet Vergara PA-C   levothyroxine 25 mcg Oral Early Morning Chet Ashley PA-C   LORazepam 1 mg Oral Q8H PRN Chetjuancho Ashley, PA-C   losartan 25 mg Oral Daily Chet Ashley PA-C   OLANZapine 5 mg Oral BID Xuan Newton MD   ondansetron 4 mg Intravenous Q6H PRN Chet Vergara, PA-MARU   oxybutynin 10 mg Oral Daily Chet Ashley PA-C   pravastatin 40 mg Oral Daily Chet Vergara PA-C   tamsulosin 0 4 mg Oral Daily With Dinner Chet Vergara PA-C   warfarin 5 mg Oral Once per day on Sun Mon Wed Fri Maria C Schuler MD   warfarin 7 5 mg Oral Once per day on Tue Thu Sat Avtar Roberts PA-C        Today, Patient Was Seen By: Juan Manuel Gray PA-C    ** Please Note: Dictation voice to text software may have been used in the creation of this document   **

## 2019-01-21 NOTE — SOCIAL WORK
Patient is medically cleared  Pt is a bedhold at [address]  Per Dionne from [address], they are able to accept Pt back  CM called [facility] and spoke to Sandy Hernandez, per Sandy Hernandez, Pt does not require pre-auth for BLS transport  CM called [facility] to arrange BLS, per Jovon Dobbins will  Pt at 1630  CM made Pt, nurse Yin Perez at [facility], and Pt's son Phan Monteroyadi of  time

## 2019-01-21 NOTE — ASSESSMENT & PLAN NOTE
· Patient with Anxiety and Depression listed on medical history of SNF  · Continue current meds  · Appreciate psych input
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
## 2019-01-21 NOTE — PLAN OF CARE
BEHAVIOR     Pt/Family maintain appropriate behavior and adhere to behavioral management agreement, if implemented Completed        CONFUSION/THOUGHT DISTURBANCE     Thought disturbances (confusion, delirium, depression, dementia or psychosis) are managed to maintain or return to baseline mental status and functional level Completed        Conchita Gaston Discharge to home or other facility with appropriate resources Completed        DISCHARGE PLANNING - CARE MANAGEMENT     Discharge to post-acute care or home with appropriate resources Completed        Knowledge Deficit     Patient/family/caregiver demonstrates understanding of disease process, treatment plan, medications, and discharge instructions Completed        NEUROSENSORY - ADULT     Achieves stable or improved neurological status Completed     Achieves maximal functionality and self care Completed        Potential for Falls     Patient will remain free of falls Completed

## 2019-01-21 NOTE — PLAN OF CARE
BEHAVIOR     Pt/Family maintain appropriate behavior and adhere to behavioral management agreement, if implemented Progressing        CONFUSION/THOUGHT DISTURBANCE     Thought disturbances (confusion, delirium, depression, dementia or psychosis) are managed to maintain or return to baseline mental status and functional level [address] Discharge to home or other facility with appropriate resources Progressing        Knowledge Deficit     Patient/family/caregiver demonstrates understanding of disease process, treatment plan, medications, and discharge instructions Progressing        NEUROSENSORY - ADULT     Achieves stable or improved neurological status Progressing     Achieves maximal functionality and self care Progressing        Potential for Falls     Patient will remain free of falls Progressing

## 2019-01-22 LAB
ATRIAL RATE: 250 BPM
QRS AXIS: -42 DEGREES
QRSD INTERVAL: 88 MS
QT INTERVAL: 384 MS
QTC INTERVAL: 437 MS
T WAVE AXIS: -39 DEGREES
VENTRICULAR RATE: 78 BPM

## 2019-01-22 PROCEDURE — 93010 ELECTROCARDIOGRAM REPORT: CPT | Performed by: INTERNAL MEDICINE

## 2019-07-10 ENCOUNTER — APPOINTMENT (EMERGENCY)
Dept: RADIOLOGY | Facility: HOSPITAL | Age: 84
End: 2019-07-10
Payer: COMMERCIAL

## 2019-07-10 ENCOUNTER — HOSPITAL ENCOUNTER (EMERGENCY)
Facility: HOSPITAL | Age: 84
Discharge: NURSING, REHAB OR LONG-TERM CARE | End: 2019-07-10
Attending: EMERGENCY MEDICINE | Admitting: EMERGENCY MEDICINE
Payer: COMMERCIAL

## 2019-07-10 VITALS
SYSTOLIC BLOOD PRESSURE: 95 MMHG | HEART RATE: 80 BPM | OXYGEN SATURATION: 97 % | RESPIRATION RATE: 18 BRPM | DIASTOLIC BLOOD PRESSURE: 52 MMHG | TEMPERATURE: 98.4 F

## 2019-07-10 DIAGNOSIS — F02.81 ALZHEIMER'S DEMENTIA WITH BEHAVIORAL DISTURBANCE, UNSPECIFIED TIMING OF DEMENTIA ONSET (HCC): Primary | ICD-10-CM

## 2019-07-10 DIAGNOSIS — G30.9 ALZHEIMER'S DEMENTIA WITH BEHAVIORAL DISTURBANCE, UNSPECIFIED TIMING OF DEMENTIA ONSET (HCC): Primary | ICD-10-CM

## 2019-07-10 PROCEDURE — 71045 X-RAY EXAM CHEST 1 VIEW: CPT

## 2019-07-10 PROCEDURE — 99283 EMERGENCY DEPT VISIT LOW MDM: CPT | Performed by: EMERGENCY MEDICINE

## 2019-07-10 PROCEDURE — 99285 EMERGENCY DEPT VISIT HI MDM: CPT

## 2019-07-10 RX ORDER — ACETAMINOPHEN 325 MG/1
650 TABLET ORAL EVERY 6 HOURS PRN
COMMUNITY

## 2019-07-10 NOTE — ED PROVIDER NOTES
History  Chief Complaint   Patient presents with    Weakness - Generalized     Pt presents to the ED with for EMS call of lethargy to [facility] home  EMS reports PCP was in today and discussed palliative care with patient and reviewed POLST, stating comfort measures, DNR/DNI/DNH  EMS reports there may have been some confusion during discussion or the family was not ready for the discussion and sent patient in to ER  Hx of thyroid carcinoma and black lung disease  This is a 80 y o  old male who presents to the ED for evaluation of weakness  Patient presents to the the ED at request of family  Patient is currently suffering from end-stage dementia and has a DNR/DNI/DNH  There is currently a pulsed form that recommends comfort measures only  The family seems to not understanding that he is dying of dementia and has some concerns over him having waxing and waning sedation/awareness  They are concerned that his oxygen has been dropping at the facility and they do not know why  They would like to find out why his oxygen levels low  They are concerned the patient was on Ativan when he should have been consuming too sedated and now they have him on Xanax and they are concerned about is similar affect  They do not seem to process the fact that he has severe end-stage dementia and recently engaged in a conversation about hospice care  They would likely benefit from palliative involvement  Prior to Admission Medications   Prescriptions Last Dose Informant Patient Reported? Taking?    LORazepam (ATIVAN) 1 mg tablet   No No   Sig: Take 1 tablet (1 mg total) by mouth every 8 (eight) hours as needed for anxiety for up to 10 days   Mirabegron ER (MYRBETRIQ) 50 MG TB24   Yes Yes   Sig: Take by mouth   OLANZapine (ZyPREXA) 2 5 mg tablet   No No   Sig: Take 2 tablets (5 mg total) by mouth 2 (two) times a day   acetaminophen (TYLENOL) 325 mg tablet   Yes Yes   Sig: Take 650 mg by mouth every 6 (six) hours as needed for mild pain   atenolol (TENORMIN) 25 mg tablet   Yes Yes   Sig: Take 25 mg by mouth daily   divalproex sodium (DEPAKOTE) 125 mg EC tablet   No Yes   Sig: Take 1 tablet (125 mg total) by mouth every 12 (twelve) hours   levothyroxine 25 mcg tablet   Yes Yes   Sig: Take 25 mcg by mouth daily   pravastatin (PRAVACHOL) 40 mg tablet   Yes Yes   Sig: Take 40 mg by mouth daily   tamsulosin (FLOMAX) 0 4 mg   Yes Yes   Sig: Take 0 4 mg by mouth daily    warfarin (COUMADIN) 2 5 mg tablet   Yes Yes   Sig: Take 5 mg by mouth daily at bedtime       Facility-Administered Medications: None     Past Medical History:   Diagnosis Date    Atrial fibrillation ([facility] )     Black lung disease ([facility] )     Cancer ([facility] )     Cardiac disease     Dementia     Hyperlipidemia     Hypertension     Hypothyroidism     Major depressive disorder      Past Surgical History:   Procedure Laterality Date    ANGIOPLASTY      THYROIDECTOMY       History reviewed  No pertinent family history  I have reviewed and agree with the history as documented  Social History     Tobacco Use    Smoking status: Former Smoker   Substance Use Topics    Alcohol use: No    Drug use: No      Review of Systems   Unable to perform ROS: Dementia     Physical Exam  Physical Exam   Constitutional: Vital signs are normal  He appears well-developed  He appears cachectic  He is cooperative  No distress  Appears older than stated age   HENT:   Head: Normocephalic and atraumatic  Mouth/Throat: No oropharyngeal exudate  Eyes: Pupils are equal, round, and reactive to light  Conjunctivae and EOM are normal  Right eye exhibits no discharge  Left eye exhibits no discharge  Neck: Normal range of motion  No JVD present  No tracheal deviation present  No thyromegaly present  Cardiovascular: Normal rate and regular rhythm  No murmur heard  Pulmonary/Chest: Effort normal  No respiratory distress   He has decreased breath sounds in the right lower field and the left lower field  He has no wheezes  He has no rales  Abdominal: Soft  Bowel sounds are normal  There is no tenderness  Musculoskeletal: Normal range of motion  He exhibits no edema  Neurological: He is alert  He has normal strength  He is disoriented  He displays atrophy  No cranial nerve deficit or sensory deficit  GCS eye subscore is 4  GCS verbal subscore is 5  GCS motor subscore is 6  Skin: Skin is warm and dry  Capillary refill takes less than 2 seconds  No rash noted  He is not diaphoretic  Psychiatric: His speech is normal and behavior is normal  Thought content normal  He is not actively hallucinating  Cognition and memory are impaired  He does not exhibit a depressed mood  He exhibits abnormal remote memory  Nursing note and vitals reviewed  Vital Signs  ED Triage Vitals [07/10/19 1906]   Temperature Pulse Respirations Blood Pressure SpO2   98 4 °F (36 9 °C) 80 18 95/52 97 %      Temp Source Heart Rate Source Patient Position - Orthostatic VS BP Location FiO2 (%)   Oral Monitor Sitting Right arm --      Pain Score       No Pain         ED Medications  Medications - No data to display    Diagnostic Studies  Results Reviewed     None        XR chest 1 view portable   ED Interpretation by Jose Allison MD (07/10 2023)   Poor inspiratory effort, no evidence of focal consolidation, pleural effusion, osseous abnormality, or pneumothroax, as interpreted by me  Procedures  Procedures    ED Course  ED Course as of Jul 10 2121   Wed Jul 10, 2019   2024 CXR OK, but with poor inspiratory effort, likley 2/2 deconditioning and positioning  A/P: This is a 80 y o  male who presents to the ED for evaluation of weakness  Given the longstanding documentation of comfort measures, sign pulse warm, I am hesitant to proceed with aggressive evaluation for this patient  I informed the family of such  They are very concerned about the need for oxygen now    Offered chest x-ray which point they have agreed  Chest x-ray shows poor inspiratory effort but no other acute findings  Discussed with family this is likely secondary to his deconditioning and bed-bound state that he now needs oxygen  They are understanding  Amenable to discharge back to facility  Transfer will be arranged  Return precautions were discussed and acknowledged by the family  MDM    Disposition  Final diagnoses:   Alzheimer's dementia with behavioral disturbance, unspecified timing of dementia onset     Time reflects when diagnosis was documented in both MDM as applicable and the Disposition within this note     Time User Action Codes Description Comment    7/10/2019  9:18 PM Weston Negron Add [G30 9,  F02 81] Alzheimer's dementia with behavioral disturbance, unspecified timing of dementia onset       ED Disposition     ED Disposition Condition Date/Time Comment    Discharge Stable Wed Jul 10, 2019  9:18 PM Oliva Madrid discharge to home/self care  Follow-up Information     Follow up With Specialties Details Why Javier Cagle MD    [address] [address]  [phone]          Patient's Medications   Discharge Prescriptions    No medications on file     No discharge procedures on file      ED Provider  Electronically Signed by           Ming Cagle MD  07/10/19 6355

## 2022-07-18 NOTE — ASSESSMENT & PLAN NOTE
Pt sent e-advice with request, forwarded to provider   · Patient with listed history of angioplasty   · Continue Atenolol, Pravastatin